# Patient Record
Sex: FEMALE | Race: WHITE | NOT HISPANIC OR LATINO | Employment: UNEMPLOYED | URBAN - METROPOLITAN AREA
[De-identification: names, ages, dates, MRNs, and addresses within clinical notes are randomized per-mention and may not be internally consistent; named-entity substitution may affect disease eponyms.]

---

## 2021-11-22 ENCOUNTER — LAB (OUTPATIENT)
Dept: FAMILY MEDICINE CLINIC | Facility: CLINIC | Age: 36
End: 2021-11-22

## 2021-11-22 ENCOUNTER — OFFICE VISIT (OUTPATIENT)
Dept: FAMILY MEDICINE CLINIC | Facility: CLINIC | Age: 36
End: 2021-11-22

## 2021-11-22 VITALS
TEMPERATURE: 98 F | BODY MASS INDEX: 21.37 KG/M2 | HEIGHT: 68 IN | OXYGEN SATURATION: 99 % | DIASTOLIC BLOOD PRESSURE: 65 MMHG | HEART RATE: 68 BPM | WEIGHT: 141 LBS | SYSTOLIC BLOOD PRESSURE: 108 MMHG

## 2021-11-22 DIAGNOSIS — D50.9 IRON DEFICIENCY ANEMIA, UNSPECIFIED IRON DEFICIENCY ANEMIA TYPE: ICD-10-CM

## 2021-11-22 DIAGNOSIS — R53.82 CHRONIC FATIGUE: ICD-10-CM

## 2021-11-22 DIAGNOSIS — R53.82 CHRONIC FATIGUE: Primary | ICD-10-CM

## 2021-11-22 DIAGNOSIS — R42 DIZZINESS: ICD-10-CM

## 2021-11-22 DIAGNOSIS — Z82.49 FAMILY HISTORY OF HEART DISEASE: ICD-10-CM

## 2021-11-22 LAB
25(OH)D3 SERPL-MCNC: 48.2 NG/ML (ref 30–100)
ALBUMIN SERPL-MCNC: 4.6 G/DL (ref 3.5–5.2)
ALBUMIN/GLOB SERPL: 1.7 G/DL
ALP SERPL-CCNC: 37 U/L (ref 39–117)
ALT SERPL W P-5'-P-CCNC: 19 U/L (ref 1–33)
ANION GAP SERPL CALCULATED.3IONS-SCNC: 11.3 MMOL/L (ref 5–15)
AST SERPL-CCNC: 19 U/L (ref 1–32)
BASOPHILS # BLD AUTO: 0.05 10*3/MM3 (ref 0–0.2)
BASOPHILS NFR BLD AUTO: 0.9 % (ref 0–1.5)
BILIRUB SERPL-MCNC: 0.2 MG/DL (ref 0–1.2)
BUN SERPL-MCNC: 12 MG/DL (ref 6–20)
BUN/CREAT SERPL: 16.7 (ref 7–25)
CALCIUM SPEC-SCNC: 9 MG/DL (ref 8.6–10.5)
CHLORIDE SERPL-SCNC: 104 MMOL/L (ref 98–107)
CHOLEST SERPL-MCNC: 173 MG/DL (ref 0–200)
CO2 SERPL-SCNC: 23.7 MMOL/L (ref 22–29)
CREAT SERPL-MCNC: 0.72 MG/DL (ref 0.57–1)
DEPRECATED RDW RBC AUTO: 46.7 FL (ref 37–54)
EOSINOPHIL # BLD AUTO: 0.02 10*3/MM3 (ref 0–0.4)
EOSINOPHIL NFR BLD AUTO: 0.3 % (ref 0.3–6.2)
ERYTHROCYTE [DISTWIDTH] IN BLOOD BY AUTOMATED COUNT: 13.7 % (ref 12.3–15.4)
FOLATE SERPL-MCNC: 8.51 NG/ML (ref 4.78–24.2)
GFR SERPL CREATININE-BSD FRML MDRD: 92 ML/MIN/1.73
GLOBULIN UR ELPH-MCNC: 2.7 GM/DL
GLUCOSE SERPL-MCNC: 89 MG/DL (ref 65–99)
HCT VFR BLD AUTO: 38.8 % (ref 34–46.6)
HDLC SERPL-MCNC: 50 MG/DL (ref 40–60)
HGB BLD-MCNC: 12 G/DL (ref 12–15.9)
IMM GRANULOCYTES # BLD AUTO: 0.01 10*3/MM3 (ref 0–0.05)
IMM GRANULOCYTES NFR BLD AUTO: 0.2 % (ref 0–0.5)
IRON 24H UR-MRATE: 30 MCG/DL (ref 37–145)
IRON SATN MFR SERPL: 6 % (ref 20–50)
LDLC SERPL CALC-MCNC: 114 MG/DL (ref 0–100)
LDLC/HDLC SERPL: 2.29 {RATIO}
LYMPHOCYTES # BLD AUTO: 1.76 10*3/MM3 (ref 0.7–3.1)
LYMPHOCYTES NFR BLD AUTO: 30 % (ref 19.6–45.3)
MCH RBC QN AUTO: 28.7 PG (ref 26.6–33)
MCHC RBC AUTO-ENTMCNC: 30.9 G/DL (ref 31.5–35.7)
MCV RBC AUTO: 92.8 FL (ref 79–97)
MONOCYTES # BLD AUTO: 0.38 10*3/MM3 (ref 0.1–0.9)
MONOCYTES NFR BLD AUTO: 6.5 % (ref 5–12)
NEUTROPHILS NFR BLD AUTO: 3.64 10*3/MM3 (ref 1.7–7)
NEUTROPHILS NFR BLD AUTO: 62.1 % (ref 42.7–76)
NRBC BLD AUTO-RTO: 0 /100 WBC (ref 0–0.2)
PLATELET # BLD AUTO: 302 10*3/MM3 (ref 140–450)
PMV BLD AUTO: 10.7 FL (ref 6–12)
POTASSIUM SERPL-SCNC: 4.6 MMOL/L (ref 3.5–5.2)
PROT SERPL-MCNC: 7.3 G/DL (ref 6–8.5)
RBC # BLD AUTO: 4.18 10*6/MM3 (ref 3.77–5.28)
SODIUM SERPL-SCNC: 139 MMOL/L (ref 136–145)
T4 FREE SERPL-MCNC: 1.22 NG/DL (ref 0.93–1.7)
TIBC SERPL-MCNC: 527 MCG/DL (ref 298–536)
TRANSFERRIN SERPL-MCNC: 354 MG/DL (ref 200–360)
TRIGL SERPL-MCNC: 42 MG/DL (ref 0–150)
TSH SERPL DL<=0.05 MIU/L-ACNC: 1.08 UIU/ML (ref 0.27–4.2)
VIT B12 BLD-MCNC: 363 PG/ML (ref 211–946)
VLDLC SERPL-MCNC: 9 MG/DL (ref 5–40)
WBC NRBC COR # BLD: 5.86 10*3/MM3 (ref 3.4–10.8)

## 2021-11-22 PROCEDURE — 84466 ASSAY OF TRANSFERRIN: CPT | Performed by: FAMILY MEDICINE

## 2021-11-22 PROCEDURE — 80053 COMPREHEN METABOLIC PANEL: CPT | Performed by: FAMILY MEDICINE

## 2021-11-22 PROCEDURE — 84439 ASSAY OF FREE THYROXINE: CPT | Performed by: FAMILY MEDICINE

## 2021-11-22 PROCEDURE — 80061 LIPID PANEL: CPT | Performed by: FAMILY MEDICINE

## 2021-11-22 PROCEDURE — 85025 COMPLETE CBC W/AUTO DIFF WBC: CPT | Performed by: FAMILY MEDICINE

## 2021-11-22 PROCEDURE — 99204 OFFICE O/P NEW MOD 45 MIN: CPT | Performed by: FAMILY MEDICINE

## 2021-11-22 PROCEDURE — 82746 ASSAY OF FOLIC ACID SERUM: CPT | Performed by: FAMILY MEDICINE

## 2021-11-22 PROCEDURE — 82306 VITAMIN D 25 HYDROXY: CPT | Performed by: FAMILY MEDICINE

## 2021-11-22 PROCEDURE — 84443 ASSAY THYROID STIM HORMONE: CPT | Performed by: FAMILY MEDICINE

## 2021-11-22 PROCEDURE — 82607 VITAMIN B-12: CPT | Performed by: FAMILY MEDICINE

## 2021-11-22 PROCEDURE — 83540 ASSAY OF IRON: CPT | Performed by: FAMILY MEDICINE

## 2021-11-22 PROCEDURE — 36415 COLL VENOUS BLD VENIPUNCTURE: CPT

## 2021-11-22 NOTE — PROGRESS NOTES
Subjective   Tyrone Pérez is a 36 y.o. female.      36-year-old female patient presents with establish care.  She is complaining of fatigue, dizziness and anemia.  Patient stated dizzy is intermittent last episode a week ago.  The symptoms noted few months ago especially when  patient run or exercise then she feels like dizzy and  presyncope.  She also has a history of iron deficiency anemia was on the iron supplement from OB/GYN which she has been stopped now due to constipation caused by iron supplement.  Patient is states she is taking daily multivitamin.  Patient also stated that there is heart disease ( not sure about  the name) is run in the family her mother,  grandmother and  paternal uncle were diagnosed with heart issues.       The following portions of the patient's history were reviewed and updated as appropriate: past medical history, past social history, past surgical history and problem list.    Review of Systems   Constitutional: Positive for fatigue. Negative for activity change, appetite change, chills and fever.   HENT: Negative for sore throat and trouble swallowing.    Respiratory: Negative for shortness of breath and wheezing.    Cardiovascular: Negative for chest pain and palpitations.   Gastrointestinal: Negative for abdominal pain, nausea and vomiting.   Endocrine: Negative for cold intolerance, heat intolerance, polydipsia, polyphagia and polyuria.   Genitourinary: Negative for dysuria and urgency.   Neurological: Positive for dizziness and light-headedness. Negative for syncope, weakness and headache.   Psychiatric/Behavioral: Negative for sleep disturbance and depressed mood. The patient is not nervous/anxious.        Objective   Physical Exam  Vitals reviewed.   Constitutional:       General: She is not in acute distress.     Appearance: She is well-developed.   HENT:      Right Ear: Tympanic membrane, ear canal and external ear normal.      Left Ear: Tympanic membrane, ear canal and  external ear normal.   Eyes:      Conjunctiva/sclera: Conjunctivae normal.   Neck:      Thyroid: No thyromegaly.   Cardiovascular:      Pulses: Normal pulses.      Heart sounds: Normal heart sounds.   Pulmonary:      Effort: Pulmonary effort is normal.      Breath sounds: Normal breath sounds. No wheezing.   Abdominal:      General: Bowel sounds are normal.      Palpations: Abdomen is soft.      Tenderness: There is no abdominal tenderness.   Musculoskeletal:         General: Normal range of motion.      Cervical back: Normal range of motion and neck supple. No tenderness.   Neurological:      General: No focal deficit present.      Mental Status: She is alert and oriented to person, place, and time.   Psychiatric:         Mood and Affect: Mood normal.         Behavior: Behavior normal.       Vitals:    11/22/21 1018   BP: 108/65   Pulse: 68   Temp: 98 °F (36.7 °C)   SpO2: 99%     No current outpatient medications on file prior to visit.     No current facility-administered medications on file prior to visit.           Assessment/Plan   Problems Addressed this Visit        Hematology and Neoplasia    Iron deficiency anemia    Relevant Orders    Comprehensive metabolic panel (Completed)    TSH (Completed)    T4, free (Completed)    Vitamin B12 (Completed)    Vitamin D 25 hydroxy (Completed)    Lipid panel (Completed)    Iron and TIBC (Completed)    Folate (Completed)    CBC w AUTO Differential (Completed)    Ambulatory Referral to Cardiology       Symptoms and Signs    Chronic fatigue - Primary    Relevant Orders    Comprehensive metabolic panel (Completed)    TSH (Completed)    T4, free (Completed)    Vitamin B12 (Completed)    Vitamin D 25 hydroxy (Completed)    Lipid panel (Completed)    Iron and TIBC (Completed)    Folate (Completed)    CBC w AUTO Differential (Completed)    Ambulatory Referral to Cardiology    Dizziness    Relevant Orders    Comprehensive metabolic panel (Completed)    TSH (Completed)    T4,  free (Completed)    Vitamin B12 (Completed)    Vitamin D 25 hydroxy (Completed)    Lipid panel (Completed)    Iron and TIBC (Completed)    Folate (Completed)    CBC w AUTO Differential (Completed)    Ambulatory Referral to Cardiology      Other Visit Diagnoses     Family history of heart disease        Relevant Orders    Ambulatory Referral to Cardiology      Diagnoses       Codes Comments    Chronic fatigue    -  Primary ICD-10-CM: R53.82  ICD-9-CM: 780.79     Iron deficiency anemia, unspecified iron deficiency anemia type     ICD-10-CM: D50.9  ICD-9-CM: 280.9     Dizziness     ICD-10-CM: R42  ICD-9-CM: 780.4     Family history of heart disease     ICD-10-CM: Z82.49  ICD-9-CM: V17.49

## 2021-12-20 PROBLEM — E04.9 GOITER: Status: ACTIVE | Noted: 2021-12-20

## 2021-12-20 RX ORDER — NORGESTREL AND ETHINYL ESTRADIOL 0.3-0.03MG
KIT ORAL
COMMUNITY
Start: 2021-11-18

## 2021-12-30 ENCOUNTER — OFFICE VISIT (OUTPATIENT)
Dept: CARDIOLOGY | Facility: CLINIC | Age: 36
End: 2021-12-30

## 2021-12-30 VITALS
BODY MASS INDEX: 21.37 KG/M2 | DIASTOLIC BLOOD PRESSURE: 58 MMHG | HEART RATE: 76 BPM | WEIGHT: 141 LBS | SYSTOLIC BLOOD PRESSURE: 100 MMHG | HEIGHT: 68 IN

## 2021-12-30 DIAGNOSIS — Z82.49 FAMILY HISTORY OF CARDIAC DISORDER: ICD-10-CM

## 2021-12-30 DIAGNOSIS — R42 DIZZINESS: Primary | ICD-10-CM

## 2021-12-30 DIAGNOSIS — R55 SYNCOPE AND COLLAPSE: ICD-10-CM

## 2021-12-30 DIAGNOSIS — R07.89 CHEST DISCOMFORT: ICD-10-CM

## 2021-12-30 DIAGNOSIS — R06.02 SHORTNESS OF BREATH: ICD-10-CM

## 2021-12-30 PROCEDURE — 99204 OFFICE O/P NEW MOD 45 MIN: CPT | Performed by: INTERNAL MEDICINE

## 2021-12-30 PROCEDURE — 93000 ELECTROCARDIOGRAM COMPLETE: CPT | Performed by: INTERNAL MEDICINE

## 2021-12-30 NOTE — PROGRESS NOTES
Date of Office Visit: 2021  Encounter Provider: Dr. Simone Ferguson  Place of Service: [facility] CARDIOLOGY [place]  Patient Name: Tyrone Pérez  :1985  Rosio Hernandez MD    Chief Complaint   Patient presents with   • Syncope     consult/family history     History of Present Illness:    I am pleased to see Mrs. Vee in my office today as a new consultation.    As you know, patient is 36-year-old white female whose past medical history is insignificant for any medical illness who is referred to me for symptom of syncope and chest discomfort.    Patient reports that she has lost 40 pounds in the last 6 to 8 months.  Recently patient is having episode of dizziness and lightheadedness.  She had had an episode of syncope when she was exercising and sat down and passed out.  Patient also complained of chest pain with exertion.  Patient described as a heaviness and tightness across the chest.  She is short of breath.  Patient denies any orthopnea PND no leg edema.    Patient does not have previous history of CAD, PCI or MI.  Patient does not smoke or abuse alcohol.    EKG showed normal sinus rhythm.  Poor progression of R wave noted.    I would recommend that patient should proceed with stress echocardiography.  I suspect her symptoms could be due to relatively low blood pressure.  I advised him to increase fluid and salt intake.  Blood pressure monitoring is recommended.        Past Medical History:   Diagnosis Date   • Syncope          Past Surgical History:   Procedure Laterality Date   • CYST REMOVAL     • FOOT SURGERY             Current Outpatient Medications:   •  Low-Ogestrel 0.3-30 MG-MCG per tablet, , Disp: , Rfl:       Social History     Socioeconomic History   • Marital status:    Tobacco Use   • Smoking status: Never Smoker   • Smokeless tobacco: Never Used   Vaping Use   • Vaping Use: Never used   Substance and Sexual Activity   • Alcohol use: Never   • Drug use: Never   • Sexual  "activity: Yes     Partners: Male         Review of Systems   Constitutional: Negative for chills and fever.   HENT: Negative for ear discharge and nosebleeds.    Eyes: Negative for discharge and redness.   Cardiovascular: Positive for chest pain and near-syncope. Negative for orthopnea, palpitations, paroxysmal nocturnal dyspnea and syncope.   Respiratory: Positive for shortness of breath. Negative for cough and wheezing.    Endocrine: Negative for heat intolerance.   Skin: Negative for rash.   Musculoskeletal: Negative for arthritis and myalgias.   Gastrointestinal: Negative for abdominal pain, melena, nausea and vomiting.   Genitourinary: Negative for dysuria and hematuria.   Neurological: Negative for dizziness, light-headedness, numbness and tremors.   Psychiatric/Behavioral: Negative for depression. The patient is not nervous/anxious.        Procedures      ECG 12 Lead    Date/Time: 12/30/2021 12:30 PM  Performed by: Simone Ferguson MD  Authorized by: Simone Ferguson MD   Comparison: compared with previous ECG   Similar to previous ECG  Rhythm: sinus rhythm  Other findings: poor R wave progression    Clinical impression: abnormal EKG            ECG 12 Lead    (Results Pending)           Objective:    /58   Pulse 76   Ht 172.7 cm (67.99\")   Wt 64 kg (141 lb)   BMI 21.44 kg/m²         Constitutional:       Appearance: Well-developed.   Eyes:      General: No scleral icterus.        Right eye: No discharge.   HENT:      Head: Normocephalic and atraumatic.   Neck:      Thyroid: No thyromegaly.      Lymphadenopathy: No cervical adenopathy.   Pulmonary:      Effort: Pulmonary effort is normal. No respiratory distress.      Breath sounds: Normal breath sounds. No wheezing. No rales.   Cardiovascular:      Normal rate. Regular rhythm.      No gallop.   Abdominal:      Tenderness: There is no abdominal tenderness.   Skin:     Findings: No erythema or rash.   Neurological:      Mental Status: Alert and oriented to " person, place, and time.             Assessment:       Diagnosis Plan   1. Dizziness  ECG 12 Lead    Adult Stress Echo W/ Cont or Stress Agent if Necessary Per Protocol   2. Family history of cardiac disorder  ECG 12 Lead    Adult Stress Echo W/ Cont or Stress Agent if Necessary Per Protocol   3. Syncope and collapse  Adult Stress Echo W/ Cont or Stress Agent if Necessary Per Protocol   4. Shortness of breath  Adult Stress Echo W/ Cont or Stress Agent if Necessary Per Protocol   5. Chest discomfort  Adult Stress Echo W/ Cont or Stress Agent if Necessary Per Protocol            Plan:       MDM:    1.  Chest discomfort:    I would recommend to proceed with stress echocardiogram    2.  Shortness of breath:    Echocardiogram should be done to assess the left ventricle function.    3.  Syncope:    I suspect this is probably due to low blood pressure.  Fall precautions are discussed.  Proceed with stress echocardiography.

## 2022-01-31 ENCOUNTER — HOSPITAL ENCOUNTER (OUTPATIENT)
Dept: CARDIOLOGY | Facility: HOSPITAL | Age: 37
Discharge: HOME | End: 2022-01-31
Admitting: INTERNAL MEDICINE

## 2022-01-31 DIAGNOSIS — Z82.49 FAMILY HISTORY OF CARDIAC DISORDER: ICD-10-CM

## 2022-01-31 DIAGNOSIS — R55 SYNCOPE AND COLLAPSE: ICD-10-CM

## 2022-01-31 DIAGNOSIS — R06.02 SHORTNESS OF BREATH: ICD-10-CM

## 2022-01-31 DIAGNOSIS — R07.89 CHEST DISCOMFORT: ICD-10-CM

## 2022-01-31 DIAGNOSIS — R42 DIZZINESS: ICD-10-CM

## 2022-01-31 PROCEDURE — 93350 STRESS TTE ONLY: CPT | Performed by: INTERNAL MEDICINE

## 2022-01-31 PROCEDURE — 93320 DOPPLER ECHO COMPLETE: CPT | Performed by: INTERNAL MEDICINE

## 2022-01-31 PROCEDURE — 25010000002 SULFUR HEXAFLUORIDE MICROSPH 60.7-25 MG RECONSTITUTED SUSPENSION: Performed by: INTERNAL MEDICINE

## 2022-01-31 PROCEDURE — 93352 ADMIN ECG CONTRAST AGENT: CPT | Performed by: INTERNAL MEDICINE

## 2022-01-31 PROCEDURE — 93320 DOPPLER ECHO COMPLETE: CPT

## 2022-01-31 PROCEDURE — 93018 CV STRESS TEST I&R ONLY: CPT | Performed by: INTERNAL MEDICINE

## 2022-01-31 PROCEDURE — 93017 CV STRESS TEST TRACING ONLY: CPT

## 2022-01-31 PROCEDURE — 93325 DOPPLER ECHO COLOR FLOW MAPG: CPT | Performed by: INTERNAL MEDICINE

## 2022-01-31 PROCEDURE — 93016 CV STRESS TEST SUPVJ ONLY: CPT | Performed by: INTERNAL MEDICINE

## 2022-01-31 PROCEDURE — 93350 STRESS TTE ONLY: CPT

## 2022-01-31 PROCEDURE — 93325 DOPPLER ECHO COLOR FLOW MAPG: CPT

## 2022-01-31 RX ADMIN — SULFUR HEXAFLUORIDE 2 ML: KIT at 12:29

## 2022-02-06 LAB
BH CV ECHO MEAS - ACS: 2.3 CM
BH CV ECHO MEAS - AO MAX PG (FULL): 1.4 MMHG
BH CV ECHO MEAS - AO MAX PG: 5.1 MMHG
BH CV ECHO MEAS - AO MEAN PG (FULL): 0.9 MMHG
BH CV ECHO MEAS - AO MEAN PG: 2.8 MMHG
BH CV ECHO MEAS - AO ROOT AREA (BSA CORRECTED): 1.9
BH CV ECHO MEAS - AO ROOT AREA: 8.8 CM^2
BH CV ECHO MEAS - AO ROOT DIAM: 3.4 CM
BH CV ECHO MEAS - AO V2 MAX: 112.9 CM/SEC
BH CV ECHO MEAS - AO V2 MEAN: 78.7 CM/SEC
BH CV ECHO MEAS - AO V2 VTI: 29.8 CM
BH CV ECHO MEAS - AVA(I,A): 2.6 CM^2
BH CV ECHO MEAS - AVA(I,D): 2.6 CM^2
BH CV ECHO MEAS - AVA(V,A): 3.3 CM^2
BH CV ECHO MEAS - AVA(V,D): 3.3 CM^2
BH CV ECHO MEAS - BSA(HAYCOCK): 1.7 M^2
BH CV ECHO MEAS - BSA: 1.8 M^2
BH CV ECHO MEAS - BZI_BMI: 21.3 KILOGRAMS/M^2
BH CV ECHO MEAS - BZI_METRIC_HEIGHT: 172.7 CM
BH CV ECHO MEAS - BZI_METRIC_WEIGHT: 63.5 KG
BH CV ECHO MEAS - EDV(CUBED): 149.8 ML
BH CV ECHO MEAS - EDV(MOD-SP4): 94.4 ML
BH CV ECHO MEAS - EDV(TEICH): 136 ML
BH CV ECHO MEAS - EF(CUBED): 64.9 %
BH CV ECHO MEAS - EF(MOD-BP): 72 %
BH CV ECHO MEAS - EF(MOD-SP4): 71.5 %
BH CV ECHO MEAS - EF(TEICH): 56 %
BH CV ECHO MEAS - ESV(CUBED): 52.6 ML
BH CV ECHO MEAS - ESV(MOD-SP4): 26.9 ML
BH CV ECHO MEAS - ESV(TEICH): 59.9 ML
BH CV ECHO MEAS - FS: 29.5 %
BH CV ECHO MEAS - IVS/LVPW: 0.94
BH CV ECHO MEAS - IVSD: 0.8 CM
BH CV ECHO MEAS - LA DIMENSION(2D): 3.6 CM
BH CV ECHO MEAS - LV DIASTOLIC VOL/BSA (35-75): 53.7 ML/M^2
BH CV ECHO MEAS - LV MASS(C)D: 158 GRAMS
BH CV ECHO MEAS - LV MASS(C)DI: 89.9 GRAMS/M^2
BH CV ECHO MEAS - LV MAX PG: 3.7 MMHG
BH CV ECHO MEAS - LV MEAN PG: 1.9 MMHG
BH CV ECHO MEAS - LV SYSTOLIC VOL/BSA (12-30): 15.3 ML/M^2
BH CV ECHO MEAS - LV V1 MAX: 96 CM/SEC
BH CV ECHO MEAS - LV V1 MEAN: 62.4 CM/SEC
BH CV ECHO MEAS - LV V1 VTI: 20.1 CM
BH CV ECHO MEAS - LVIDD: 5.3 CM
BH CV ECHO MEAS - LVIDS: 3.7 CM
BH CV ECHO MEAS - LVOT AREA: 3.9 CM^2
BH CV ECHO MEAS - LVOT DIAM: 2.2 CM
BH CV ECHO MEAS - LVPWD: 0.86 CM
BH CV ECHO MEAS - MV A MAX VEL: 56.7 CM/SEC
BH CV ECHO MEAS - MV DEC SLOPE: 415.5 CM/SEC^2
BH CV ECHO MEAS - MV DEC TIME: 0.21 SEC
BH CV ECHO MEAS - MV E MAX VEL: 89.1 CM/SEC
BH CV ECHO MEAS - MV E/A: 1.6
BH CV ECHO MEAS - MV MAX PG: 2.3 MMHG
BH CV ECHO MEAS - MV MEAN PG: 0.9 MMHG
BH CV ECHO MEAS - MV V2 MAX: 75.2 CM/SEC
BH CV ECHO MEAS - MV V2 MEAN: 44.7 CM/SEC
BH CV ECHO MEAS - MV V2 VTI: 25.4 CM
BH CV ECHO MEAS - MVA(VTI): 3.1 CM^2
BH CV ECHO MEAS - PA MAX PG (FULL): 1.2 MMHG
BH CV ECHO MEAS - PA MAX PG: 2.6 MMHG
BH CV ECHO MEAS - PA V2 MAX: 81.2 CM/SEC
BH CV ECHO MEAS - PI END-D VEL: 95.1 CM/SEC
BH CV ECHO MEAS - PI MAX PG: 13 MMHG
BH CV ECHO MEAS - PI MAX VEL: 180.5 CM/SEC
BH CV ECHO MEAS - RV MAX PG: 1.4 MMHG
BH CV ECHO MEAS - RV MEAN PG: 0.65 MMHG
BH CV ECHO MEAS - RV V1 MAX: 59.7 CM/SEC
BH CV ECHO MEAS - RV V1 MEAN: 36.6 CM/SEC
BH CV ECHO MEAS - RV V1 VTI: 15.5 CM
BH CV ECHO MEAS - RVDD: 1.8 CM
BH CV ECHO MEAS - SI(AO): 149.7 ML/M^2
BH CV ECHO MEAS - SI(CUBED): 55.3 ML/M^2
BH CV ECHO MEAS - SI(LVOT): 44.2 ML/M^2
BH CV ECHO MEAS - SI(MOD-SP4): 38.4 ML/M^2
BH CV ECHO MEAS - SI(TEICH): 43.3 ML/M^2
BH CV ECHO MEAS - SV(AO): 262.9 ML
BH CV ECHO MEAS - SV(CUBED): 97.2 ML
BH CV ECHO MEAS - SV(LVOT): 77.6 ML
BH CV ECHO MEAS - SV(MOD-SP4): 67.5 ML
BH CV ECHO MEAS - SV(TEICH): 76.1 ML
BH CV ECHO MEAS - TR MAX VEL: 212.9 CM/SEC
BH CV STRESS ECHO POST STRESS EJECTION FRACTION EF: 73 %

## 2022-02-07 ENCOUNTER — OFFICE VISIT (OUTPATIENT)
Dept: CARDIOLOGY | Facility: CLINIC | Age: 37
End: 2022-02-07

## 2022-02-07 VITALS
HEART RATE: 78 BPM | HEIGHT: 68 IN | SYSTOLIC BLOOD PRESSURE: 102 MMHG | BODY MASS INDEX: 21.37 KG/M2 | DIASTOLIC BLOOD PRESSURE: 68 MMHG | WEIGHT: 141 LBS

## 2022-02-07 DIAGNOSIS — R55 SYNCOPE AND COLLAPSE: ICD-10-CM

## 2022-02-07 DIAGNOSIS — R42 DIZZINESS: Primary | ICD-10-CM

## 2022-02-07 DIAGNOSIS — R07.89 CHEST DISCOMFORT: ICD-10-CM

## 2022-02-07 DIAGNOSIS — I95.9 HYPOTENSION, UNSPECIFIED HYPOTENSION TYPE: ICD-10-CM

## 2022-02-07 PROCEDURE — 99214 OFFICE O/P EST MOD 30 MIN: CPT | Performed by: INTERNAL MEDICINE

## 2022-02-07 NOTE — PROGRESS NOTES
Date of Office Visit: 2022  Encounter Provider: Dr. Simone Ferguson  Place of Service: [facility] CARDIOLOG[facility] [place]  Patient Name: Tyrone Pérez  :1985  Rosio Hernandez MD    Chief Complaint   Patient presents with   • Syncope     6 week follow up/stress echo     History of Present Illness    I am pleased to see Mrs. Vee in my office today as a follow-up    As you know, patient is 36-year-old white female whose past medical history is insignificant for any medical illness who was referred to me for symptom of syncope and chest discomfort.    In 2021, patient was presented to me for symptom of near syncope and chest discomfort.  Patient underwent stress echocardiography which showed no myocardial ischemia or infarction.  Echocardiogram showed PFO with right-to-left shunting on Valsalva maneuver.  Borderline mitral valve prolapse was noted with mild mitral regurgitation.    Since the previous visit, patient is overall stable.  Her symptom of dizziness and lightheadedness has improved.  No further episode of syncope.  Chest pain is contained.  Patient denies any orthopnea, PND, syncope or presyncope.    Patient underwent stress test which was unremarkable for ischemia or myocardial infarction.  She is noted to have PFO.  She is totally asymptomatic at this stage I would recommend observation.  Patient still have relatively low blood pressure but she is asymptomatic.  I advised the patient to monitor the blood pressure bring the logbook on next visit.  Increase fluid and salt intake is recommended.  Patient is recommended resistance and tonic exercises for lower extremities.  Consider midodrine in future if blood pressure continues to be low when she is symptomatic        Past Medical History:   Diagnosis Date   • Syncope          Past Surgical History:   Procedure Laterality Date   • CYST REMOVAL     • FOOT SURGERY             Current Outpatient Medications:   •  Low-Ogestrel 0.3-30  "MG-MCG per tablet, , Disp: , Rfl:       Social History     Socioeconomic History   • Marital status:    Tobacco Use   • Smoking status: Never Smoker   • Smokeless tobacco: Never Used   Vaping Use   • Vaping Use: Never used   Substance and Sexual Activity   • Alcohol use: Never   • Drug use: Never   • Sexual activity: Yes     Partners: Male         Review of Systems   Constitutional: Negative for chills and fever.   HENT: Negative for ear discharge and nosebleeds.    Eyes: Negative for discharge and redness.   Cardiovascular: Negative for chest pain, orthopnea, palpitations, paroxysmal nocturnal dyspnea and syncope.   Respiratory: Negative for cough, shortness of breath and wheezing.    Endocrine: Negative for heat intolerance.   Skin: Negative for rash.   Musculoskeletal: Negative for arthritis and myalgias.   Gastrointestinal: Negative for abdominal pain, melena, nausea and vomiting.   Genitourinary: Negative for dysuria and hematuria.   Neurological: Negative for dizziness, light-headedness, numbness and tremors.   Psychiatric/Behavioral: Negative for depression. The patient is not nervous/anxious.        Procedures    Procedures    No orders to display           Objective:    /68   Pulse 78   Ht 172.7 cm (67.99\")   Wt 64 kg (141 lb)   BMI 21.44 kg/m²         Constitutional:       Appearance: Well-developed.   Eyes:      General: No scleral icterus.        Right eye: No discharge.   HENT:      Head: Normocephalic and atraumatic.   Neck:      Thyroid: No thyromegaly.      Lymphadenopathy: No cervical adenopathy.   Pulmonary:      Effort: Pulmonary effort is normal. No respiratory distress.      Breath sounds: Normal breath sounds. No wheezing. No rales.   Cardiovascular:      Normal rate. Regular rhythm.      No gallop.   Abdominal:      Tenderness: There is no abdominal tenderness.   Skin:     Findings: No erythema or rash.   Neurological:      Mental Status: Alert and oriented to person, place, " and time.             Assessment:       Diagnosis Plan   1. Dizziness     2. Syncope and collapse     3. Chest discomfort     4. Hypotension, unspecified hypotension type              Plan:       MDM:    1.  Chest discomfort:    Patient underwent stress test which was negative for ischemia or myocardial infarction.  Recommend observation    2.  Dizziness and lightheadedness:    Patient had symptoms are contained.  I advised her to increase aerobic activity.  Tonic exercises are recommended.  Salt and water intake is emphasized    3.  Syncope:    Patient has not had any further episode.  Continue to observe    4.  Hypotension:    Patient is advised to increase fluid and salt intake.  I would consider midodrine if her blood pressure continues to be low when she is symptomatic